# Patient Record
Sex: MALE | Race: BLACK OR AFRICAN AMERICAN
[De-identification: names, ages, dates, MRNs, and addresses within clinical notes are randomized per-mention and may not be internally consistent; named-entity substitution may affect disease eponyms.]

---

## 2019-07-03 ENCOUNTER — HOSPITAL ENCOUNTER (EMERGENCY)
Dept: HOSPITAL 61 - ER | Age: 49
Discharge: HOME | End: 2019-07-03
Payer: COMMERCIAL

## 2019-07-03 VITALS — SYSTOLIC BLOOD PRESSURE: 164 MMHG | DIASTOLIC BLOOD PRESSURE: 89 MMHG

## 2019-07-03 VITALS — HEIGHT: 71 IN | WEIGHT: 220 LBS | BODY MASS INDEX: 30.8 KG/M2

## 2019-07-03 DIAGNOSIS — I10: Primary | ICD-10-CM

## 2019-07-03 DIAGNOSIS — R42: ICD-10-CM

## 2019-07-03 DIAGNOSIS — J45.909: ICD-10-CM

## 2019-07-03 LAB
ALBUMIN SERPL-MCNC: 3.6 G/DL (ref 3.4–5)
ALBUMIN/GLOB SERPL: 1 {RATIO} (ref 1–1.7)
ALP SERPL-CCNC: 53 U/L (ref 46–116)
ALT SERPL-CCNC: 31 U/L (ref 16–63)
ANION GAP SERPL CALC-SCNC: 8 MMOL/L (ref 6–14)
APTT PPP: YELLOW S
AST SERPL-CCNC: 18 U/L (ref 15–37)
BACTERIA #/AREA URNS HPF: 0 /HPF
BASOPHILS # BLD AUTO: 0.1 X10^3/UL (ref 0–0.2)
BASOPHILS NFR BLD: 1 % (ref 0–3)
BILIRUB SERPL-MCNC: 0.7 MG/DL (ref 0.2–1)
BILIRUB UR QL STRIP: NEGATIVE
BUN SERPL-MCNC: 8 MG/DL (ref 8–26)
BUN/CREAT SERPL: 6 (ref 6–20)
CALCIUM SERPL-MCNC: 8.7 MG/DL (ref 8.5–10.1)
CHLORIDE SERPL-SCNC: 105 MMOL/L (ref 98–107)
CO2 SERPL-SCNC: 26 MMOL/L (ref 21–32)
CREAT SERPL-MCNC: 1.3 MG/DL (ref 0.7–1.3)
EOSINOPHIL NFR BLD: 0.1 X10^3/UL (ref 0–0.7)
EOSINOPHIL NFR BLD: 2 % (ref 0–3)
ERYTHROCYTE [DISTWIDTH] IN BLOOD BY AUTOMATED COUNT: 15.5 % (ref 11.5–14.5)
FIBRINOGEN PPP-MCNC: CLEAR MG/DL
GFR SERPLBLD BASED ON 1.73 SQ M-ARVRAT: 58.4 ML/MIN
GLOBULIN SER-MCNC: 3.6 G/DL (ref 2.2–3.8)
GLUCOSE SERPL-MCNC: 103 MG/DL (ref 70–99)
HCT VFR BLD CALC: 38 % (ref 39–53)
HGB BLD-MCNC: 13.2 G/DL (ref 13–17.5)
LYMPHOCYTES # BLD: 1.8 X10^3/UL (ref 1–4.8)
LYMPHOCYTES NFR BLD AUTO: 28 % (ref 24–48)
MCH RBC QN AUTO: 31 PG (ref 25–35)
MCHC RBC AUTO-ENTMCNC: 35 G/DL (ref 31–37)
MCV RBC AUTO: 88 FL (ref 79–100)
MONO #: 0.4 X10^3/UL (ref 0–1.1)
MONOCYTES NFR BLD: 7 % (ref 0–9)
NEUT #: 4.1 X10^3UL (ref 1.8–7.7)
NEUTROPHILS NFR BLD AUTO: 63 % (ref 31–73)
NITRITE UR QL STRIP: NEGATIVE
PH UR STRIP: 6 [PH]
PLATELET # BLD AUTO: 215 X10^3/UL (ref 140–400)
POTASSIUM SERPL-SCNC: 3.9 MMOL/L (ref 3.5–5.1)
PROT SERPL-MCNC: 7.2 G/DL (ref 6.4–8.2)
PROT UR STRIP-MCNC: NEGATIVE MG/DL
RBC # BLD AUTO: 4.3 X10^6/UL (ref 4.3–5.7)
RBC #/AREA URNS HPF: (no result) /HPF (ref 0–2)
SODIUM SERPL-SCNC: 139 MMOL/L (ref 136–145)
UROBILINOGEN UR-MCNC: 0.2 MG/DL
WBC # BLD AUTO: 6.5 X10^3/UL (ref 4–11)
WBC #/AREA URNS HPF: 0 /HPF (ref 0–4)

## 2019-07-03 PROCEDURE — 71045 X-RAY EXAM CHEST 1 VIEW: CPT

## 2019-07-03 PROCEDURE — 84484 ASSAY OF TROPONIN QUANT: CPT

## 2019-07-03 PROCEDURE — 85025 COMPLETE CBC W/AUTO DIFF WBC: CPT

## 2019-07-03 PROCEDURE — 93005 ELECTROCARDIOGRAM TRACING: CPT

## 2019-07-03 PROCEDURE — 84443 ASSAY THYROID STIM HORMONE: CPT

## 2019-07-03 PROCEDURE — 36415 COLL VENOUS BLD VENIPUNCTURE: CPT

## 2019-07-03 PROCEDURE — 81001 URINALYSIS AUTO W/SCOPE: CPT

## 2019-07-03 PROCEDURE — 80053 COMPREHEN METABOLIC PANEL: CPT

## 2019-07-03 PROCEDURE — 70450 CT HEAD/BRAIN W/O DYE: CPT

## 2019-07-03 NOTE — RAD
AP portable chest  radiograph 7/3/2019

 

Clinical History: Unexplained dizziness.

 

An AP erect portable digital radiograph of the chest was obtained.

 

Comparison study is dated 3/7/2012.

 

The cardiac silhouette is borderline enlarged. The thoracic aorta is 

mildly tortuous. No acute pulmonary infiltrate is seen. No pleural 

effusion or pneumothorax is noted. The osseous structures are grossly 

intact.

 

Impression: No acute abnormality is seen.

 

Electronically signed by: Markel Reid MD (7/3/2019 11:16 PM) [employer]

## 2019-07-03 NOTE — PHYS DOC
Past Medical History


Past Medical History:  Asthma, Bronchitis


Past Surgical History:  Other


Additional Past Surgical Histo:  TESTICULAR TORSION REPAIR


Alcohol Use:  None


Drug Use:  None





Adult General


Chief Complaint


Chief Complaint:  DIZZY/LIGHT HEADED





HPI


HPI


Patient is a 49  year old -American male with history of hypertension who

presents with dizziness while sitting. Patient states symptoms began several 

hours prior to arrival while working on the computer. Denies headache, blurred 

vision, chest pain palpitations. Denies vertigo, nausea, vomiting, and neck 

pain. No focal extremity weakness or loss of sensation. Patient states felt as 

though he would pass out if he stood. Symptoms were improved with sitting. 

Patient states he checked his blood pressure which was 170/102. Patient is p

rescribed amlodipine but does not take on a routine her daily basis. Patient did

take one dose of amlodipine prior to ED arrival. States symptoms continue been 

overall have improved and that he has had prior episodes of dizziness. No other 

acute symptoms or complaints.  []





Review of Systems


Review of Systems


Review symptoms as per history of present illness. All other review symptoms are

 negative.





All other systems were reviewed and found to be within normal limits, except as 

documented in this note.





Allergies


Allergies





Allergies








Coded Allergies Type Severity Reaction Last Updated Verified


 


  No Known Drug Allergies    7/3/19 No











Physical Exam


Physical Exam





Constitutional: Well developed, well nourished, no acute distress, non-toxic 

appearance. []


HENT: Normocephalic, atraumatic, bilateral external ears normal, oropharynx 

moist, no oral exudates, nose normal. []


Eyes: PERRLA, EOMI, conjunctiva normal, no discharge. [] 


Neck: Normal range of motion, no tenderness, supple, no stridor. [] 


Cardiovascular:Heart rate regular rhythm, no murmur []


Lungs & Thorax:  Bilateral breath sounds clear to auscultation []


Abdomen: Bowel sounds normal, soft, no tenderness. [] 


Skin: Warm, dry, no erythema, no rash. [] 


Back: No tenderness, no CVA tenderness. [] 


Extremities: No tenderness, no cyanosis, no clubbing, ROM intact, no edema. [] 


Neurologic: Alert and oriented X 3, normal motor function, normal sensory 

function, no focal deficits noted. []


Psychologic: Affect normal, judgement normal, mood normal. []





Current Patient Data


Vital Signs





                                   Vital Signs








  Date Time  Temp Pulse Resp B/P (MAP) Pulse Ox O2 Delivery O2 Flow Rate FiO2


 


7/3/19 21:40 98.6 85 18 172/109 (130) 99 Room Air  





 98.6       








Lab Values





                                Laboratory Tests








Test


 7/3/19


21:50 7/3/19


22:00


 


White Blood Count


 6.5 x10^3/uL


(4.0-11.0) 





 


Red Blood Count


 4.30 x10^6/uL


(4.30-5.70) 





 


Hemoglobin


 13.2 g/dL


(13.0-17.5) 





 


Hematocrit


 38.0 %


(39.0-53.0)  L 





 


Mean Corpuscular Volume


 88 fL ()


 





 


Mean Corpuscular Hemoglobin 31 pg (25-35)   


 


Mean Corpuscular Hemoglobin


Concent 35 g/dL


(31-37) 





 


Red Cell Distribution Width


 15.5 %


(11.5-14.5)  H 





 


Platelet Count


 215 x10^3/uL


(140-400) 





 


Neutrophils (%) (Auto) 63 % (31-73)   


 


Lymphocytes (%) (Auto) 28 % (24-48)   


 


Monocytes (%) (Auto) 7 % (0-9)   


 


Eosinophils (%) (Auto) 2 % (0-3)   


 


Basophils (%) (Auto) 1 % (0-3)   


 


Neutrophils # (Auto)


 4.1 x10^3uL


(1.8-7.7) 





 


Lymphocytes # (Auto)


 1.8 x10^3/uL


(1.0-4.8) 





 


Monocytes # (Auto)


 0.4 x10^3/uL


(0.0-1.1) 





 


Eosinophils # (Auto)


 0.1 x10^3/uL


(0.0-0.7) 





 


Basophils # (Auto)


 0.1 x10^3/uL


(0.0-0.2) 





 


Sodium Level


 139 mmol/L


(136-145) 





 


Potassium Level


 3.9 mmol/L


(3.5-5.1) 





 


Chloride Level


 105 mmol/L


() 





 


Carbon Dioxide Level


 26 mmol/L


(21-32) 





 


Anion Gap 8 (6-14)   


 


Blood Urea Nitrogen


 8 mg/dL (8-26)


 





 


Creatinine


 1.3 mg/dL


(0.7-1.3) 





 


Estimated GFR


(Cockcroft-Gault) 58.4  


 





 


BUN/Creatinine Ratio 6 (6-20)   


 


Glucose Level


 103 mg/dL


(70-99)  H 





 


Calcium Level


 8.7 mg/dL


(8.5-10.1) 





 


Total Bilirubin


 0.7 mg/dL


(0.2-1.0) 





 


Aspartate Amino Transferase


(AST) 18 U/L (15-37)


 





 


Alanine Aminotransferase (ALT)


 31 U/L (16-63)


 





 


Alkaline Phosphatase


 53 U/L


() 





 


Troponin I Quantitative


 < 0.017 ng/mL


(0.000-0.055) 





 


Total Protein


 7.2 g/dL


(6.4-8.2) 





 


Albumin


 3.6 g/dL


(3.4-5.0) 





 


Albumin/Globulin Ratio 1.0 (1.0-1.7)   


 


Thyroid Stimulating Hormone


(TSH) 1.753 uIU/mL


(0.358-3.74) 





 


Urine Collection Type  Unknown  


 


Urine Color  Yellow  


 


Urine Clarity  Clear  


 


Urine pH  6.0  


 


Urine Specific Gravity  1.015  


 


Urine Protein


 


 Negative mg/dL


(NEG-TRACE)


 


Urine Glucose (UA)


 


 Negative mg/dL


(NEG)


 


Urine Ketones (Stick)


 


 Negative mg/dL


(NEG)


 


Urine Blood


 


 Negative (NEG)





 


Urine Nitrite


 


 Negative (NEG)





 


Urine Bilirubin


 


 Negative (NEG)





 


Urine Urobilinogen Dipstick


 


 0.2 mg/dL (0.2


mg/dL)


 


Urine Leukocyte Esterase


 


 Negative (NEG)





 


Urine RBC


 


 Occ /HPF (0-2)





 


Urine WBC  0 /HPF (0-4)  


 


Urine Bacteria


 


 0 /HPF (0-FEW)








                                Laboratory Tests


7/3/19 21:50








                                Laboratory Tests


7/3/19 21:50














EKG


EKG


[EKG: reviewed]





Radiology/Procedures


Radiology/Procedures


[CT head: No acute disease per radiology report





Chest x-ray: No pulmonary infiltrate.]





Course & Med Decision Making


Course & Med Decision Making


Pertinent Labs and Imaging studies reviewed. (See chart for details)





[Dizziness improved with blood pressure control. CT, lab and EKG nondiagnostic. 

Blood pressure 130s over 80s prior to discharge. Recommend compliance with home 

blood pressure medication and close monitoring and PCP follow-up. Return 

precautions reviewed. Patient verbalizes understanding agreement discharge 

instructions prior to departure.]





Dragon Disclaimer


Dragon Disclaimer


This electronic medical record was generated, in whole or in part, using a voice

recognition dictation system.





Departure


Departure


Impression:  


   Primary Impression:  


   Hypertension


   Additional Impression:  


   Dizziness


Disposition:  01 HOME/RESIDENCE PRIOR TO ADM


Condition:  GOOD


Patient Instructions:  Dizziness, Easy-to-Read, Hypertension





Additional Instructions:  


You were evaluated in the emergency department for dizziness and hypertension. 

CT EKG and lab work were performed and are nondiagnostic. Please take next dose 

of your blood pressure medication in the morning and then continue to take 

daily. Check blood pressure prior to bedtime and follow-up with your PCP in the 

office next week for reevaluation. In the meantime if you develop new or 

worsening symptoms return to the ED.





Problem Qualifiers











VESTA DE LA TORRE DO                    Jul 3, 2019 23:35

## 2019-07-04 NOTE — EKG
[facility]

              [address] [place] [address]

Test Date:    2019               Test Time:    21:43:35

Pat Name:     DAYAMI JULES             Department:   

Patient ID:   [id number]           Room:          

Gender:       M                        Technician:   

:          [id number]               Requested By: VESTA DE LA TORRE

Order Number: [id number]           Reading MD:     

                                 Measurements

Intervals                              Axis          

Rate:         80                       P:            36

NV:           164                      QRS:          41

QRSD:         90                       T:            5

QT:           312                                    

QTc:          363                                    

                           Interpretive Statements

SINUS RHYTHM

NORMAL ECG

RI6.01          Unconfirmed report

No previous ECG available for comparison